# Patient Record
Sex: MALE | URBAN - METROPOLITAN AREA
[De-identification: names, ages, dates, MRNs, and addresses within clinical notes are randomized per-mention and may not be internally consistent; named-entity substitution may affect disease eponyms.]

---

## 2020-10-05 ENCOUNTER — OFFICE VISIT (OUTPATIENT)
Dept: URBAN - METROPOLITAN AREA CLINIC 7 | Facility: CLINIC | Age: 55
End: 2020-10-05
Payer: MEDICARE

## 2020-10-05 DIAGNOSIS — H25.11 AGE-RELATED NUCLEAR CATARACT, RIGHT EYE: ICD-10-CM

## 2020-10-05 PROCEDURE — 67228 TREATMENT X10SV RETINOPATHY: CPT | Performed by: OPHTHALMOLOGY

## 2020-10-05 PROCEDURE — 99204 OFFICE O/P NEW MOD 45 MIN: CPT | Performed by: OPHTHALMOLOGY

## 2020-10-05 PROCEDURE — 92134 CPTRZ OPH DX IMG PST SGM RTA: CPT | Performed by: OPHTHALMOLOGY

## 2020-10-05 ASSESSMENT — INTRAOCULAR PRESSURE
OS: 16
OD: 15

## 2020-10-05 NOTE — IMPRESSION/PLAN
Impression: Type 2 diab with prolif diab rtnop without macular edema, bi: [id number]. OCT OU = no SRF/IRF OU Plan: OD: pre-ret heme, but peripheral;  no DME; only traction of NVD at nerve, not at retina OS: well healed s/p PPV/EL in the past

RBAC's d/w patient. Rec med management with anti-VEGF and PRP and hold off on PPV/EL for worsening. Discussed signs and symptoms of retinal detachment. Emphasized control blood pressure, blood sugar and lipid. PRP laser RBAs explained. .  Pt. informed that VA does not generally improve after laser. Goal is to preserve & maintain current VA. The risks of laser include loss of PVA, chance of hemorrhage & loss of eye. This includes dec. VA, need for mult. laser tx, scotoma, persistent hemorrhaging, persistent poor vision, dec.peripheral/night vision. The patient understands that some important surgical tasks may be performed by a fellow under my direct supervision, and consents to such. Patient understands and accepts. PRP LASER COMPLETED TODAY as per protocol without complications. laser spots placed outside the temporal arcades. 

2-4 weeks OCT OU re-eval Avastin OU (in [facility])

## 2020-11-02 ENCOUNTER — OFFICE VISIT (OUTPATIENT)
Dept: URBAN - METROPOLITAN AREA CLINIC 83 | Facility: CLINIC | Age: 55
End: 2020-11-02
Payer: MEDICARE

## 2020-11-02 DIAGNOSIS — E11.3593 TYPE 2 DIABETES MELLITUS WITH PROLIFERATIVE DIABETIC RETINOPATHY WITHOUT MACULAR EDEMA, BILATERAL: Primary | ICD-10-CM

## 2020-11-02 PROCEDURE — 92134 CPTRZ OPH DX IMG PST SGM RTA: CPT | Performed by: OPHTHALMOLOGY

## 2020-11-02 PROCEDURE — 99213 OFFICE O/P EST LOW 20 MIN: CPT | Performed by: OPHTHALMOLOGY

## 2020-11-02 ASSESSMENT — INTRAOCULAR PRESSURE
OD: 13
OS: 16

## 2020-11-02 NOTE — IMPRESSION/PLAN
Impression: Type 2 diab with prolif diab rtnop without macular edema, bi: [id number]. OCT OU = no SRF/IRF OU, +mild macular traction OD, temporal atrophy OS
s/p PRP OD 10/5/20 Plan: OD: pre-retinal heme has dispersed into VH
OS: well healed s/p PPV/EL in the past

RBAC's d/w patient. Rec observation for now given symptomatic improvement. Will require PPV/MP/EL in future if blood does not dissipate further. Elevate HOB. Refuses Avastin RTC 1 month DFE OD

## 2022-01-24 ENCOUNTER — OFFICE VISIT (OUTPATIENT)
Dept: URBAN - METROPOLITAN AREA CLINIC 83 | Facility: CLINIC | Age: 57
End: 2022-01-24
Payer: MEDICARE

## 2022-01-24 DIAGNOSIS — Z96.1 PRESENCE OF INTRAOCULAR LENS: ICD-10-CM

## 2022-01-24 PROCEDURE — 92134 CPTRZ OPH DX IMG PST SGM RTA: CPT | Performed by: OPHTHALMOLOGY

## 2022-01-24 PROCEDURE — 99214 OFFICE O/P EST MOD 30 MIN: CPT | Performed by: OPHTHALMOLOGY

## 2022-01-24 ASSESSMENT — INTRAOCULAR PRESSURE
OS: 14
OD: 16

## 2022-01-24 NOTE — IMPRESSION/PLAN
Impression: Type 2 diab with prolif diab rtnop without macular edema, bi: [id number]. OCT OU = no SRF/IRF OU, +mild nasal macular traction OD, temporal atrophy OS
s/p PRP OD 10/5/20 Plan: Pt lost to f/u for 14 months. Exam shows chronic VH OD. View not good enough for additional laser. Persistent pre-retinal traction over the disc and nasal macula. Exam/OCT OS shows foveal irregularity but no significant edema. Discussed findings with patient. Refuses Avastin. Rec observation for now given symptomatic improvement. May require PPV/MP/EL in future if blood does not fully clear.   

RTC 3-4 months for FA OU (transit OD) with sweeps, OCT OU, re-eval

## 2022-09-27 ENCOUNTER — OFFICE VISIT (OUTPATIENT)
Facility: LOCATION | Age: 57
End: 2022-09-27
Payer: MEDICARE

## 2022-09-27 DIAGNOSIS — E11.3593 TYPE 2 DIAB WITH PROLIF DIAB RTNOP WITHOUT MACULAR EDEMA, BI: Primary | ICD-10-CM

## 2022-09-27 DIAGNOSIS — H04.123 DRY EYE SYNDROME OF BILATERAL LACRIMAL GLANDS: ICD-10-CM

## 2022-09-27 DIAGNOSIS — Z96.1 PRESENCE OF INTRAOCULAR LENS: ICD-10-CM

## 2022-09-27 DIAGNOSIS — H43.813 VITREOUS DEGENERATION, BILATERAL: ICD-10-CM

## 2022-09-27 DIAGNOSIS — H25.11 AGE-RELATED NUCLEAR CATARACT, RIGHT EYE: ICD-10-CM

## 2022-09-27 PROCEDURE — 92014 COMPRE OPH EXAM EST PT 1/>: CPT | Performed by: OPHTHALMOLOGY

## 2022-09-27 PROCEDURE — 92134 CPTRZ OPH DX IMG PST SGM RTA: CPT | Performed by: OPHTHALMOLOGY

## 2022-09-27 ASSESSMENT — INTRAOCULAR PRESSURE
OS: 16
OD: 14

## 2022-09-27 NOTE — IMPRESSION/PLAN
Impression: PDR, OU.
s/p PPV x3 OS last 2012 ([address] [facility]) Plan: Patient has relocated to [place]. The VH OD has cleared. There are tractional pegs of NVE in the periphery. The patient refuses Avastin. The patient will consider surgery OD. Exam and OCT reveal no DME OU. 

Return in 3 months for follow up, OCT OU, IVFA OD 1st

## 2023-04-25 ENCOUNTER — OFFICE VISIT (OUTPATIENT)
Facility: LOCATION | Age: 58
End: 2023-04-25
Payer: MEDICARE

## 2023-04-25 DIAGNOSIS — E11.3593 TYPE 2 DIAB WITH PROLIF DIAB RTNOP WITHOUT MACULAR EDEMA, BI: Primary | ICD-10-CM

## 2023-04-25 DIAGNOSIS — H25.11 AGE-RELATED NUCLEAR CATARACT, RIGHT EYE: ICD-10-CM

## 2023-04-25 DIAGNOSIS — Z96.1 PRESENCE OF INTRAOCULAR LENS: ICD-10-CM

## 2023-04-25 DIAGNOSIS — H43.813 VITREOUS DEGENERATION, BILATERAL: ICD-10-CM

## 2023-04-25 DIAGNOSIS — H04.123 DRY EYE SYNDROME OF BILATERAL LACRIMAL GLANDS: ICD-10-CM

## 2023-04-25 PROCEDURE — 92134 CPTRZ OPH DX IMG PST SGM RTA: CPT | Performed by: OPHTHALMOLOGY

## 2023-04-25 PROCEDURE — 92235 FLUORESCEIN ANGRPH MLTIFRAME: CPT | Performed by: OPHTHALMOLOGY

## 2023-04-25 PROCEDURE — 99214 OFFICE O/P EST MOD 30 MIN: CPT | Performed by: OPHTHALMOLOGY

## 2023-04-25 ASSESSMENT — INTRAOCULAR PRESSURE
OD: 11
OS: 11

## 2023-04-25 NOTE — IMPRESSION/PLAN
Impression: PDR, OU.
s/p PPV x3 OS last 2012 ([address] [facility] [address] [facility]) Plan: Patient has relocated to [place]. The VH OD has cleared. There are tractional pegs of NVE in the periphery. The patient refuses Avastin. The patient will consider surgery OD. Exam and OCT reveal no DME OU. An IVFA from 04/25/2023 demonstrated no NVE. Fundus Photos from 04/25/2023 demonstrated no NVD. Observe. BS control. Carotenoids. 

Return in 18 months for follow up, OCT OU, IVFA OD 1st

## 2025-06-25 ENCOUNTER — OFFICE VISIT (OUTPATIENT)
Dept: URBAN - METROPOLITAN AREA CLINIC 71 | Facility: CLINIC | Age: 60
End: 2025-06-25
Payer: MEDICARE

## 2025-06-25 DIAGNOSIS — Z96.1 PRESENCE OF INTRAOCULAR LENS: ICD-10-CM

## 2025-06-25 DIAGNOSIS — E11.3393 DIABETES MELLITUS TYPE 2 WITH MODERATE NON-PROLIFERATIVE RETINOPATHY WITHOUT MACULAR EDEMA, BILATERAL: Primary | ICD-10-CM

## 2025-06-25 DIAGNOSIS — H25.811 COMBINED FORMS OF AGE-RELATED CATARACT, RIGHT EYE: ICD-10-CM

## 2025-06-25 DIAGNOSIS — H35.372 PUCKERING OF MACULA, LEFT EYE: ICD-10-CM

## 2025-06-25 PROCEDURE — 92134 CPTRZ OPH DX IMG PST SGM RTA: CPT | Performed by: OPHTHALMOLOGY

## 2025-06-25 PROCEDURE — 92133 CPTRZD OPH DX IMG PST SGM ON: CPT | Performed by: OPHTHALMOLOGY

## 2025-06-25 PROCEDURE — 99203 OFFICE O/P NEW LOW 30 MIN: CPT | Performed by: OPHTHALMOLOGY

## 2025-06-25 ASSESSMENT — VISUAL ACUITY
OD: 20/100
OS: 20/30

## 2025-06-25 ASSESSMENT — INTRAOCULAR PRESSURE
OS: 15
OD: 10